# Patient Record
Sex: FEMALE | URBAN - METROPOLITAN AREA
[De-identification: names, ages, dates, MRNs, and addresses within clinical notes are randomized per-mention and may not be internally consistent; named-entity substitution may affect disease eponyms.]

---

## 2022-03-01 ENCOUNTER — TRANSFERRED RECORDS (OUTPATIENT)
Dept: HEALTH INFORMATION MANAGEMENT | Facility: CLINIC | Age: 82
End: 2022-03-01
Payer: COMMERCIAL

## 2022-03-01 LAB — RETINOPATHY: NEGATIVE

## 2022-03-24 ENCOUNTER — TRANSCRIBE ORDERS (OUTPATIENT)
Dept: OTHER | Age: 82
End: 2022-03-24
Payer: COMMERCIAL

## 2022-03-24 DIAGNOSIS — H53.2 DOUBLE VISION: Primary | ICD-10-CM

## 2022-05-10 ENCOUNTER — OFFICE VISIT (OUTPATIENT)
Dept: OPHTHALMOLOGY | Facility: CLINIC | Age: 82
End: 2022-05-10
Attending: OPHTHALMOLOGY
Payer: COMMERCIAL

## 2022-05-10 DIAGNOSIS — H50.22 HYPERTROPIA OF LEFT EYE: Primary | ICD-10-CM

## 2022-05-10 DIAGNOSIS — H53.2 DOUBLE VISION: ICD-10-CM

## 2022-05-10 DIAGNOSIS — H50.34 INTERMITTENT EXOTROPIA, ALTERNATING: ICD-10-CM

## 2022-05-10 DIAGNOSIS — H53.10 SUBJECTIVE VISUAL DISTURBANCE: ICD-10-CM

## 2022-05-10 PROCEDURE — 92060 SENSORIMOTOR EXAMINATION: CPT | Performed by: OPHTHALMOLOGY

## 2022-05-10 PROCEDURE — 99204 OFFICE O/P NEW MOD 45 MIN: CPT | Mod: GC | Performed by: OPHTHALMOLOGY

## 2022-05-10 PROCEDURE — V2718 FRESNELL PRISM PRESS-ON LENS: HCPCS | Mod: GC | Performed by: OPHTHALMOLOGY

## 2022-05-10 PROCEDURE — G0463 HOSPITAL OUTPT CLINIC VISIT: HCPCS | Mod: 25

## 2022-05-10 RX ORDER — FLUOCINONIDE GEL 0.5 MG/G
GEL TOPICAL
COMMUNITY
Start: 2022-03-11

## 2022-05-10 RX ORDER — WARFARIN SODIUM 5 MG/1
TABLET ORAL
COMMUNITY
Start: 2022-05-04

## 2022-05-10 RX ORDER — FUROSEMIDE 40 MG
TABLET ORAL
COMMUNITY
Start: 2022-05-04

## 2022-05-10 RX ORDER — PRAMIPEXOLE DIHYDROCHLORIDE 0.5 MG/1
TABLET ORAL
COMMUNITY
Start: 2022-05-09

## 2022-05-10 RX ORDER — DOCUSATE SODIUM 100 MG/1
100 CAPSULE, LIQUID FILLED ORAL
COMMUNITY

## 2022-05-10 RX ORDER — NYSTATIN 100000 [USP'U]/G
POWDER TOPICAL
COMMUNITY
Start: 2020-08-13

## 2022-05-10 RX ORDER — ZINC GLUCONATE 50 MG
25 TABLET ORAL
COMMUNITY
Start: 2022-02-14

## 2022-05-10 RX ORDER — LOSARTAN POTASSIUM 100 MG/1
TABLET ORAL
COMMUNITY
Start: 2022-05-04

## 2022-05-10 RX ORDER — CELECOXIB 200 MG/1
200 CAPSULE ORAL
COMMUNITY
Start: 2021-12-18

## 2022-05-10 RX ORDER — OMEPRAZOLE 40 MG/1
40 CAPSULE, DELAYED RELEASE ORAL
COMMUNITY
Start: 2021-09-09

## 2022-05-10 RX ORDER — ALBUTEROL SULFATE 0.83 MG/ML
2.5 SOLUTION RESPIRATORY (INHALATION)
COMMUNITY
Start: 2022-02-11

## 2022-05-10 RX ORDER — CARVEDILOL 12.5 MG/1
12.5 TABLET ORAL
COMMUNITY
Start: 2021-12-18

## 2022-05-10 ASSESSMENT — TONOMETRY
OD_IOP_MMHG: 11
OS_IOP_MMHG: 16

## 2022-05-10 ASSESSMENT — REFRACTION_WEARINGRX
OD_AXIS: 165
OD_ADD: +2.50
OS_CYLINDER: +1.00
OS_SPHERE: -0.50
OS_ADD: +3.00
OS_AXIS: 163
OD_SPHERE: -0.75
OD_CYLINDER: +0.50
SPECS_TYPE: BIFOCAL

## 2022-05-10 ASSESSMENT — EXTERNAL EXAM - RIGHT EYE: OD_EXAM: NORMAL

## 2022-05-10 ASSESSMENT — VISUAL ACUITY
CORRECTION_TYPE: GLASSES
METHOD: SNELLEN - LINEAR
OS_CC+: -2
OS_CC: 20/30
OD_CC: 20/25
OD_CC+: -2

## 2022-05-10 ASSESSMENT — SLIT LAMP EXAM - LIDS
COMMENTS: NORMAL
COMMENTS: NORMAL

## 2022-05-10 ASSESSMENT — EXTERNAL EXAM - LEFT EYE: OS_EXAM: NORMAL

## 2022-05-10 NOTE — LETTER
May 10, 2022    RE: Opal Dominguez  : 1940  MRN: [id number]    Dear Dr. Beauchamp    Thank you for referring your patient, Opal Dominguez, to my neuro-ophthalmology clinic recently.  After a thorough neuro-ophthalmic history and examination, I came to the following conclusions:     1. Decompensated congenital phoria symptomatic because of extreme up gaze only head position that the patient must adopt due to severe kyphosis.  Patient has relatively comitant small angle left hypertropia and small angle exotropia symptomatic only in extreme upgaze. Extraocular motility essentially full in both eyes. She has no diplopia in true primary however she cannot hold her head in such a position for long.     -She was given Fresnel prism which could lessen the need to extend the neck to achieve single vision however even with the prism she does not maintain fusion in the extreme upgaze position she adopts.  She is not a candidate for strabismus surgery as her fusion is unlikely to improve with surgery when the primary problem is her severe scoliosis.     Extreme head posture and diplopia in eccentric extreme up gaze is secondary to underlying spinal curvature abnormality. Patient has not seen spine specialist.  I recommended that she do so because I do not believe that strabismus surgery or prism glasses will alleviate her diplopia but establishing a more normal gaze position close to primary gaze (by improving her severe kyphosis) would almost certainly help her diplopia.      In the meantime she may cover / occlude one eye to help her diplopia but of course this would also eliminate her stereopsis.    Opal Dominguez is a pleasant 82 year old female who presents to my neuro-ophthalmology clinic today for evaluation of      HPI:  Patient was recently evaluated for diplopia by Dr. Beauchamp at [facility] on 3/1/22. She has had progressive kyphotic curvature (patient reports she does not have scoliosis) with hunching-  present for several years but in the last year it has severely worsened.    Reports that she has had diplopia for between 6-12 months. She can lift her head up with difficulty and does not have diplopia when in true primary. She has persistent diplopia with in upgaze. They note when she is writing she sees sloping.     They are not interested in spine surgery.    History of colon cancer 7-8 years ago. Localized. Patient reports that she never had distant metastasis.    Patient currently undergoing evaluation for lymphadenopathy and swollen tongue. Plan to get a tongue biopsy soon per Daughter.     Independent historians: Patient and Daughter present today    Review of outside testing:  OCT Macula 3/1/22      My interpretation performed today of outside testing:  Not available for review    Review of outside clinical notes:  Mir Beauchamp MD VRS 3/1/22        Past medical history: Colon CA s/p colectomy with ostomy, BRVO left eye, chronic respiratory failure with hypoxia, DMII, peripheral venous insufficiency, HTN, history of PE, obesity hypoventilation syndrome,  HLD, Crohn's, RLS    Meds: losartan, Lasix, Mirapex, albuterol PRN, Coumadin, omeprazole     Family history / social history: ARMD    Past ocular history: BRVO left eye 2007 got anti-VEGF shots. No glaucoma or ARMD    Exam:  Visual acuity with correction was 20/25 in the right eye and 20/30 in the left eye. IOP was 11 in the right eye and 16 in the left eye. Visual fields were full in both eyes. Ocular motility was essentially full in all gaze directions. Color plates were 5/11 in the right eye and 4/11 in the left eye.  Pupils were equal, round and reactive to light with no RAPD.     Strabismus exam: comitant small angle LHT and exotropia. Unstable fusion in far up gaze.  Attempted to correct up gaze with prisms but fusion remains unstable even with appropriate prism correction.  In mild up gaze and primary gaze she has stable fusion but patient is unable  to hold this head position.    Anterior segment exam: PCIOL status post YAG laser capsulotomy in both eyes.   Dilated fundus exam: limited views due to kyphosis.     Tests ordered and interpreted today:  Sensorimotor as above      Again, thank you for trusting me with the care of your patient.  For further exam details, please feel free to contact our office for additional records.  If you wish to contact me regarding this patient please email me at [email or web address] or give my clinic a call to arrange a phone conversation.    Sincerely,    Jag Lott MD  , Neuro-Ophthalmology and Adult Strabismus Surgery  [facility] in Neuro-Ophthalmology  Department of Ophthalmology and Visual Neurosciences  [facility]    DX: exotropia , left hypertropia, severe kyphosis

## 2022-05-10 NOTE — PROGRESS NOTES
1. Decompensated congenital phoria symptomatic because of extreme up gaze only head position that the patient must adopt due to severe kyphosis.  Patient has relatively comitant small angle left hypertropia and small angle exotropia symptomatic only in extreme upgaze. Extraocular motility essentially full in both eyes. She has no diplopia in true primary however she cannot hold her head in such a position for long.     -She was given Fresnel prism which could lessen the need to extend the neck to achieve single vision however even with the prism she does not maintain fusion in the extreme upgaze position she adopts.  She is not a candidate for strabismus surgery as her fusion is unlikely to improve with surgery when the primary problem is her severe scoliosis.     Extreme head posture and diplopia in eccentric extreme up gaze is secondary to underlying spinal curvature abnormality. Patient has not seen spine specialist.  I recommended that she do so because I do not believe that strabismus surgery or prism glasses will alleviate her diplopia but establishing a more normal gaze position close to primary gaze (by improving her severe kyphosis) would almost certainly help her diplopia.      In the meantime she may cover / occlude one eye to help her diplopia but of course this would also eliminate her stereopsis.    Opal Dominguez is a pleasant 82 year old female who presents to my neuro-ophthalmology clinic today for evaluation of      HPI:  Patient was recently evaluated for diplopia by Dr. Beauchamp at [facility] on 3/1/22. She has had progressive kyphotic curvature (patient reports she does not have scoliosis) with hunching- present for several years but in the last year it has severely worsened.    Reports that she has had diplopia for between 6-12 months. She can lift her head up with difficulty and does not have diplopia when in true primary. She has persistent diplopia with in upgaze. They note when she is  writing she sees sloping.     They are not interested in spine surgery.    History of colon cancer 7-8 years ago. Localized. Patient reports that she never had distant metastasis.    Patient currently undergoing evaluation for lymphadenopathy and swollen tongue. Plan to get a tongue biopsy soon per Daughter.     Independent historians: Patient and Daughter present today    Review of outside testing:  OCT Macula 3/1/22      My interpretation performed today of outside testing:  Not available for review    Review of outside clinical notes:  Mir Beauchamp MD VRS 3/1/22        Past medical history: Colon CA s/p colectomy with ostomy, BRVO left eye, chronic respiratory failure with hypoxia, DMII, peripheral venous insufficiency, HTN, history of PE, obesity hypoventilation syndrome,  HLD, Crohn's, RLS    Meds: losartan, Lasix, Mirapex, albuterol PRN, Coumadin, omeprazole     Family history / social history: ARMD    Past ocular history: BRVO left eye 2007 got anti-VEGF shots. No glaucoma or ARMD    Exam:  Visual acuity with correction was 20/25 in the right eye and 20/30 in the left eye. IOP was 11 in the right eye and 16 in the left eye. Visual fields were full in both eyes. Ocular motility was essentially full in all gaze directions. Color plates were 5/11 in the right eye and 4/11 in the left eye.  Pupils were equal, round and reactive to light with no RAPD.     Strabismus exam: comitant small angle LHT and exotropia. Unstable fusion in far up gaze.  Attempted to correct up gaze with prisms but fusion remains unstable even with appropriate prism correction.  In mild up gaze and primary gaze she has stable fusion but patient is unable to hold this head position.    Anterior segment exam: PCIOL status post YAG laser capsulotomy in both eyes.   Dilated fundus exam: limited views due to kyphosis.     Tests ordered and interpreted today:  Sensorimotor as above         Miki Hart, DO   PGY-5 Neuro-Ophthalmology Fellow    45  minutes were spent on the date of the encounter by me doing chart review, history and exam, documentation, and further activities as noted above    Complete documentation of historical and exam elements from today's encounter can be found in the full encounter summary report (not reduplicated in this progress note).  I personally obtained the chief complaint(s) and history of present illness.  I confirmed and edited as necessary the review of systems, past medical/surgical history, family history, social history, and examination findings as documented by others; and I examined the patient myself.  I personally reviewed the relevant tests, images, and reports as documented above.  I formulated and edited as necessary the assessment and plan and discussed the findings and management plan with the patient and family.  I personally reviewed the ophthalmic test(s) associated with this encounter, agree with the interpretation(s) as documented by the resident/fellow, and have edited the corresponding report(s) as necessary.     Jag Lott MD

## 2022-05-10 NOTE — NURSING NOTE
Chief Complaint(s) and History of Present Illness(es)     Diplopia Evaluation     Laterality: both eyes    Associated symptoms: Negative for droopy eyelid, unequal pupil size and headaches    Comments: Referral from Dr. Mir Beauchamp for diplopia evaluation.   Had a cataract removed last fall. Got new Rx after healing from cataract surgery, since then have been noticing diagonal diplopia. Has an chin down AHP, when lifting head up image returns to single but keeping head up is very hard for pt. Has not tried prism glasses. Vision seems good but hard to tell due to AHP.              Comments     Inf: daughter/ pt     Constant chin down, hard for patient to hold head up. Has tried PT.    POH: Retinal vein occlusion LE with superotemporal sector and superior macula involvement, vitreous detachment BE  Complex medical history:    Allergic rhinitis 6/23/2011     Anticoagulant long-term use 6/23/2011     Arthritis     Blood transfusion     Cancer ([facility])     Carpal tunnel syndrome     Cholelithiasis 1/19/2012     Chronic respiratory failure with hypoxia ([facility]) 11/10/2020     Claustrophobia     Crohn's disease ([facility]) 06/23/2011     DM type 2 (diabetes mellitus, type 2) ([facility]) 6/23/2011     Hearing loss 6/23/2011     Hernia of abdominal wall 8/11/2011     HTN (hypertension) 6/23/2011     Hyperlipidemia 6/23/2011     Hypertension     Hypoventilation associated with obesity ([facility]) 6/23/2011      Morbid obesity with BMI of 40.0-44.9, adult ([facility]) 12/20/2018     Nephrolithiasis     Opioid dependence ([facility]) 05/14/2015     Osteoarthritis 6/23/2011     Osteopenia 4/27/2016     PE (pulmonary thromboembolism) ([facility])     Rectal cancer 6/23/2011     S/P colostomy 11/17/2010     VENTRAL HERNIA 6/23/2011     Vitamin D deficiency 6/23/2011

## 2022-05-10 NOTE — NURSING NOTE
Chief Complaint(s) and History of Present Illness(es)     Diplopia Evaluation     Laterality: both eyes    Associated symptoms: Negative for droopy eyelid, unequal pupil size and headaches    Comments: Referral from Dr. Mir Beauchamp for diplopia evaluation.   Had a cataract removed last fall. Got new Rx after healing from cataract surgery, since then have been noticing diagonal diplopia. Has an chin down AHP, when lifting head up image returns to single but keeping head up is very hard for pt. Has not tried prism glasses. Vision seems good but hard to tell due to AHP.              Comments     Inf: daughter/ pt     Constant chin down, hard for patient to hold head up. Has tried PT.    Complex medical history:    Allergic rhinitis 6/23/2011     Anticoagulant long-term use 6/23/2011     Arthritis     Blood transfusion     Cancer ([facility])     Carpal tunnel syndrome     Cholelithiasis 1/19/2012     Chronic respiratory failure with hypoxia ([facility]) 11/10/2020     Claustrophobia     Crohn's disease ([facility]) 06/23/2011     DM type 2 (diabetes mellitus, type 2) ([facility]) 6/23/2011     Hearing loss 6/23/2011     Hernia of abdominal wall 8/11/2011     HTN (hypertension) 6/23/2011     Hyperlipidemia 6/23/2011     Hypertension     Hypoventilation associated with obesity ([facility]) 6/23/2011      Morbid obesity with BMI of 40.0-44.9, adult ([facility]) 12/20/2018     Nephrolithiasis     Opioid dependence ([facility]) 05/14/2015     Osteoarthritis 6/23/2011     Osteopenia 4/27/2016     PE (pulmonary thromboembolism) ([facility])     Rectal cancer 6/23/2011     S/P colostomy 11/17/2010     VENTRAL HERNIA 6/23/2011     Vitamin D deficiency 6/23/2011